# Patient Record
Sex: FEMALE | Race: WHITE | NOT HISPANIC OR LATINO | URBAN - METROPOLITAN AREA
[De-identification: names, ages, dates, MRNs, and addresses within clinical notes are randomized per-mention and may not be internally consistent; named-entity substitution may affect disease eponyms.]

---

## 2024-07-25 ENCOUNTER — EMERGENCY (EMERGENCY)
Facility: HOSPITAL | Age: 79
LOS: 1 days | Discharge: HOME | End: 2024-07-25
Attending: EMERGENCY MEDICINE
Payer: MEDICARE

## 2024-07-25 VITALS
OXYGEN SATURATION: 98 % | HEIGHT: 63 IN | WEIGHT: 154.98 LBS | SYSTOLIC BLOOD PRESSURE: 149 MMHG | RESPIRATION RATE: 18 BRPM | DIASTOLIC BLOOD PRESSURE: 80 MMHG | TEMPERATURE: 99 F | HEART RATE: 79 BPM

## 2024-07-25 PROCEDURE — 96372 THER/PROPH/DIAG INJ SC/IM: CPT

## 2024-07-25 PROCEDURE — 99284 EMERGENCY DEPT VISIT MOD MDM: CPT

## 2024-07-25 PROCEDURE — 99283 EMERGENCY DEPT VISIT LOW MDM: CPT

## 2024-07-25 RX ORDER — METHOCARBAMOL 500 MG
2 TABLET ORAL
Qty: 18 | Refills: 0
Start: 2024-07-25 | End: 2024-07-27

## 2024-07-25 RX ORDER — DEXAMETHASONE 1 MG/1
6 TABLET ORAL ONCE
Refills: 0 | Status: COMPLETED | OUTPATIENT
Start: 2024-07-25 | End: 2024-07-25

## 2024-07-25 RX ORDER — KETOROLAC TROMETHAMINE 30 MG/ML
30 INJECTION, SOLUTION INTRAMUSCULAR ONCE
Refills: 0 | Status: DISCONTINUED | OUTPATIENT
Start: 2024-07-25 | End: 2024-07-25

## 2024-07-25 RX ORDER — METHOCARBAMOL 500 MG
1500 TABLET ORAL ONCE
Refills: 0 | Status: COMPLETED | OUTPATIENT
Start: 2024-07-25 | End: 2024-07-25

## 2024-07-25 RX ADMIN — KETOROLAC TROMETHAMINE 30 MILLIGRAM(S): 30 INJECTION, SOLUTION INTRAMUSCULAR at 14:33

## 2024-07-25 RX ADMIN — Medication 1500 MILLIGRAM(S): at 14:34

## 2024-07-25 RX ADMIN — DEXAMETHASONE 6 MILLIGRAM(S): 1 TABLET ORAL at 14:33

## 2024-07-25 NOTE — ED PROVIDER NOTE - PHYSICAL EXAMINATION
Const: Awake, alert and oriented. In no acute distress. Well appearing.  HEENT: NC/AT. Moist mucous membranes.  Eyes: No scleral icterus. EOMI.  Neck:. Soft and supple. Full ROM without pain.  Cardiac: +S1/S2. No murmurs. Peripheral pulses 2+ and symmetric. No LE edema.  Resp: Speaking in full sentences. No evidence of respiratory distress. No wheezes, rales or rhonchi.  Abd: Soft, non-tender, non-distended. Normal bowel sounds in all 4 quadrants. No guarding or rebound.  Back: Spine midline and non-tender. No CVAT.  MSK: No bony tenderness on palpation FROM in all extremities neurovasculary intact DP palpable   Skin: No rashes, abrasions or lacerations.  Lymph: No cervical lymphadenopathy.  Neuro: Awake, alert & oriented x 3. CN II-XII intact neurovasculary intact muscle strength fair gait without ataxia reflexes intact

## 2024-07-25 NOTE — ED ADULT TRIAGE NOTE - CHIEF COMPLAINT QUOTE
c/o of L side lower back radiating down her leg x1 week. Reports taking Aleeve this morning without relief. Hx of bursitis L hip

## 2024-07-25 NOTE — ED PROVIDER NOTE - NSFOLLOWUPINSTRUCTIONS_ED_ALL_ED_FT
take medications as prescribed  follow up with orthopedics outpatient  return to the emergency department for any worsening symptoms

## 2024-07-25 NOTE — ED ADULT TRIAGE NOTE - NSSEPSISSUSPECTED_ED_A_ED
No

## 2024-07-25 NOTE — ED PROVIDER NOTE - PATIENT PORTAL LINK FT
You can access the FollowMyHealth Patient Portal offered by [facility] by registering at the following website: [email or web address]. By joining [facility] [facility]’s FollowMyHealth portal, you will also be able to view your health information using other applications (apps) compatible with our system.

## 2024-07-25 NOTE — ED PROVIDER NOTE - CLINICAL SUMMARY MEDICAL DECISION MAKING FREE TEXT BOX
pt is a 79 year old female presenting to the ed for evaluation for left hip/lower back pain radiating down the leg for the past week. pt states she experienced this back in january and went to orthopedic in [facility]. she had xray of left hip was told she has bursitis. pt states pain started suddenly one week ago no injuries or trauma to the area. no neuro deficits on exam, xray of hip - negative for abnormalities, pain control , follow up with orthopedics

## 2024-07-25 NOTE — ED PROVIDER NOTE - OBJECTIVE STATEMENT
pt is a 79 year old female presenting to the ed for evaluation for left hip/lower back pain radiating down the leg for the past week. pt states she experienced this back in january and went to orthopedic in [facility]. she had xray of left hip was told she has bursitis. pt states pain started suddenly one week ago no injuries or trauma to the area. pt denies cp sob fever abd pain nausea vomiting numbness or loss of sensation urinary or fecal incontinence dysuria

## 2024-07-25 NOTE — ED ADULT NURSE NOTE - OBJECTIVE STATEMENT
Pt a&ox4 complains of left leg/buttock pain radiating down leg for 1 1/2 week. Pt's respirations even and unlabored on room air, VSS, respirations even and unlabored on room air, no distress noted. Pt medicated with PO and IM medication no distress noted.

## 2024-07-25 NOTE — ED PROVIDER NOTE - CARE PROVIDER_API CALL
Rich Alford  Orthopaedic Surgery  [address] [place] [id number]  Phone: [phone]  Fax: [phone]  Follow Up Time:

## 2024-07-25 NOTE — ED ADULT NURSE NOTE - NSFALLUNIVINTERV_ED_ALL_ED
Bed/Stretcher in lowest position, wheels locked, appropriate side rails in place/Call bell, personal items and telephone in reach/Instruct patient to call for assistance before getting out of bed/chair/stretcher/Non-slip footwear applied when patient is off stretcher/[place] to call system/Physically safe environment - no spills, clutter or unnecessary equipment/Purposeful proactive rounding/Room/bathroom lighting operational, light cord in reach

## 2024-07-29 ENCOUNTER — APPOINTMENT (OUTPATIENT)
Age: 79
End: 2024-07-29
Payer: MEDICARE

## 2024-07-29 VITALS
HEART RATE: 88 BPM | WEIGHT: 162 LBS | BODY MASS INDEX: 28.7 KG/M2 | SYSTOLIC BLOOD PRESSURE: 135 MMHG | HEIGHT: 63 IN | DIASTOLIC BLOOD PRESSURE: 76 MMHG

## 2024-07-29 DIAGNOSIS — M54.16 RADICULOPATHY, LUMBAR REGION: ICD-10-CM

## 2024-07-29 DIAGNOSIS — M70.62 TROCHANTERIC BURSITIS, LEFT HIP: ICD-10-CM

## 2024-07-29 PROBLEM — Z00.00 ENCOUNTER FOR PREVENTIVE HEALTH EXAMINATION: Status: ACTIVE | Noted: 2024-07-29

## 2024-07-29 PROCEDURE — G2211 COMPLEX E/M VISIT ADD ON: CPT

## 2024-07-29 PROCEDURE — 73502 X-RAY EXAM HIP UNI 2-3 VIEWS: CPT

## 2024-07-29 PROCEDURE — 99203 OFFICE O/P NEW LOW 30 MIN: CPT

## 2024-07-29 RX ORDER — MELOXICAM 15 MG/1
15 TABLET ORAL
Qty: 30 | Refills: 0 | Status: ACTIVE | COMMUNITY
Start: 2024-07-29 | End: 1900-01-01

## 2024-07-29 RX ORDER — METHYLPREDNISOLONE 4 MG/1
4 TABLET ORAL
Qty: 1 | Refills: 0 | Status: ACTIVE | COMMUNITY
Start: 2024-07-29 | End: 1900-01-01

## 2024-07-29 NOTE — DISCUSSION/SUMMARY
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  She is not a try conservative treatment I think that is warranted at this time.  I have given a prescription for an ultrasound-guided cortisone injection into her left greater trochanteric bursa.  We discussed low impact activity and exercise.  I recommend physical therapy.  Given prescription for meloxicam 15 mg daily as needed for pain.  I recommended Medrol Dosepak and given a prescription for that.  I will see her back on an as-needed basis for her left hip.  All questions were asked and answered

## 2024-07-29 NOTE — HISTORY OF PRESENT ILLNESS
[de-identified] : Patient is a 79-year-old female here today for evaluation of left hip pain has been going on for the past 2 weeks.  Patient states that she has severe pain over the lateral aspect of the hip as well as in her back radiating down her leg.  Feels an electric shooting like sensation.  States that hurts when she lays on her hip.  Has a history of bursitis in the past was given injection in October that she states relieved her pain.  Is taking anti-inflammatories without relief.  Denies any falls or trauma

## 2024-07-29 NOTE — PHYSICAL EXAM
[de-identified] : GENERAL APPEARANCE: Well nourished and hydrated, pleasant, alert, and oriented x 3. Appears their stated age.  HEENT: Normocephalic, extraocular eye motion intact. Nasal septum midline. Oral cavity clear. External auditory canal clear.  RESPIRATORY: Breath sounds clear and audible in all lobes. No wheezing, No accessory muscle use. CARDIOVASCULAR: No apparent abnormalities. No lower leg edema. No varicosities. Pedal pulses are palpable. NEUROLOGIC: Sensation is normal, no muscle weakness in the upper or lower extremities. DERMATOLOGIC: No apparent skin lesions, moist, warm, no rash. SPINE: Cervical spine appears normal and moves freely; thoracic spine appears normal and moves freely;  there is tenderness palpation over the left paraspinal musculature MUSCULOSKELETAL: Hands, wrists, and elbows are normal and move freely, shoulders are normal and move freely.  Psychiatric: Oriented to person, place, and time, insight and judgement were intact and the affect was normal.  Musculoskeletal: ambulates normally. Left hip exam showed no groin pain with SLR, ROM is full without pain, CARLO negative, FADIR negative.  There is tenderness palpation over the left greater trochanter 5/5 motor strength in bilateral lower extremities. Sensory: Intact in bilateral lower extremities. DTRs: Biceps, brachioradialis, triceps, patellar, ankle and plantar 2+ and symmetric bilaterally. Pulses: dorsalis pedis, posterior tibial, femoral, popliteal, and radial 2+ and symmetric bilaterally.   [de-identified] : AP pelvis and 2 views of the left hip obtained the office today show no acute fracture or dislocation.  No significant degenerative changes noted

## 2024-08-01 ENCOUNTER — OUTPATIENT (OUTPATIENT)
Dept: OUTPATIENT SERVICES | Facility: HOSPITAL | Age: 79
LOS: 1 days | End: 2024-08-01

## 2024-08-01 ENCOUNTER — APPOINTMENT (OUTPATIENT)
Dept: INTERVENTIONAL RADIOLOGY/VASCULAR | Facility: CLINIC | Age: 79
End: 2024-08-01
Payer: MEDICARE

## 2024-08-01 ENCOUNTER — RESULT REVIEW (OUTPATIENT)
Age: 79
End: 2024-08-01

## 2024-08-01 DIAGNOSIS — M70.62 TROCHANTERIC BURSITIS, LEFT HIP: ICD-10-CM

## 2024-08-01 PROCEDURE — 27093 INJECTION FOR HIP X-RAY: CPT | Mod: LT

## 2024-08-01 PROCEDURE — 73525 CONTRAST X-RAY OF HIP: CPT | Mod: 26,LT

## 2025-07-05 ENCOUNTER — NON-APPOINTMENT (OUTPATIENT)
Age: 80
End: 2025-07-05

## 2025-09-11 ENCOUNTER — APPOINTMENT (OUTPATIENT)
Dept: ORTHOPEDIC SURGERY | Facility: CLINIC | Age: 80
End: 2025-09-11
Payer: MEDICARE

## 2025-09-11 VITALS
WEIGHT: 150 LBS | HEIGHT: 62.5 IN | SYSTOLIC BLOOD PRESSURE: 154 MMHG | BODY MASS INDEX: 26.91 KG/M2 | HEART RATE: 66 BPM | DIASTOLIC BLOOD PRESSURE: 84 MMHG

## 2025-09-11 DIAGNOSIS — M47.816 SPONDYLOSIS W/OUT MYELOPATHY OR RADICULOPATHY, LUMBAR REGION: ICD-10-CM

## 2025-09-11 PROCEDURE — 72100 X-RAY EXAM L-S SPINE 2/3 VWS: CPT

## 2025-09-11 PROCEDURE — 99213 OFFICE O/P EST LOW 20 MIN: CPT

## 2025-09-11 RX ORDER — ESCITALOPRAM OXALATE 20 MG/1
TABLET ORAL
Refills: 0 | Status: ACTIVE | COMMUNITY

## 2025-09-11 RX ORDER — HYDROCHLOROTHIAZIDE 12.5 MG/1
CAPSULE ORAL
Refills: 0 | Status: ACTIVE | COMMUNITY

## 2025-09-11 RX ORDER — SIMVASTATIN 80 MG/1
TABLET, FILM COATED ORAL
Refills: 0 | Status: ACTIVE | COMMUNITY

## 2025-09-11 RX ORDER — METHYLPREDNISOLONE 4 MG/1
4 TABLET ORAL
Qty: 1 | Refills: 0 | Status: ACTIVE | COMMUNITY
Start: 2025-09-11 | End: 1900-01-01